# Patient Record
Sex: FEMALE | Race: WHITE | NOT HISPANIC OR LATINO | Employment: UNEMPLOYED | URBAN - NONMETROPOLITAN AREA
[De-identification: names, ages, dates, MRNs, and addresses within clinical notes are randomized per-mention and may not be internally consistent; named-entity substitution may affect disease eponyms.]

---

## 2022-01-28 ENCOUNTER — OFFICE VISIT (OUTPATIENT)
Dept: URGENT CARE | Facility: PHYSICIAN GROUP | Age: 27
End: 2022-01-28

## 2022-01-28 VITALS
SYSTOLIC BLOOD PRESSURE: 118 MMHG | RESPIRATION RATE: 16 BRPM | HEART RATE: 76 BPM | BODY MASS INDEX: 23.21 KG/M2 | OXYGEN SATURATION: 96 % | WEIGHT: 131 LBS | TEMPERATURE: 98.2 F | HEIGHT: 63 IN | DIASTOLIC BLOOD PRESSURE: 72 MMHG

## 2022-01-28 DIAGNOSIS — A63.0 GENITAL WARTS: ICD-10-CM

## 2022-01-28 PROCEDURE — 99204 OFFICE O/P NEW MOD 45 MIN: CPT | Performed by: NURSE PRACTITIONER

## 2022-01-28 RX ORDER — PODOFILOX 5 MG/G
GEL TOPICAL
Qty: 3.5 G | Refills: 2 | Status: SHIPPED | OUTPATIENT
Start: 2022-01-28 | End: 2022-02-01

## 2022-01-28 ASSESSMENT — ENCOUNTER SYMPTOMS
CHILLS: 0
FEVER: 0

## 2022-01-28 NOTE — PATIENT INSTRUCTIONS
Genital Warts  Genital warts are a common STD (sexually transmitted disease). They may appear as small bumps on the skin of the genital and anal areas. They sometimes become irritated and cause pain. Genital warts are easily passed to other people through sexual contact. Many people do not know that they are infected. They may be infected for years without symptoms. However, even if they do not have symptoms, they can pass the infection to their sexual partners.  Getting treatment is important because genital warts can lead to other problems. In females, the virus that causes genital warts may increase the risk for cervical cancer.  What are the causes?  This condition is caused by a virus that is called human papillomavirus (HPV). HPV is spread by having unprotected sex with an infected person. It can be spread through vaginal, anal, and oral sex.  What increases the risk?  You are more likely to develop this condition if:  · You have unprotected sex.  · You have multiple sexual partners.  · You are sexually active before age 16.  · You are a man who is not circumcised.  · You have a male sexual partner who is not circumcised.  · You have a weakened body defense system (immune system) due to disease or medicine.  · You smoke.  What are the signs or symptoms?  Symptoms of this condition include:  · Small growths in the genital area or anal area. These warts often grow in clusters.  · Itching and irritation in the genital area or anal area.  · Bleeding from the warts.  · Pain during sex.  How is this diagnosed?  This condition is diagnosed based on:  · Your symptoms.  · A physical exam.  You may also have other tests, including:  · Biopsy. A tissue sample is removed so it can be checked under a microscope.  · Colposcopy. In females, a magnifying tool is used to examine the vagina and cervix. Certain solutions may be used to make the HPV cells change color so they can be seen more easily.  · A Pap test in  females.  · Tests for other STDs.  How is this treated?  This condition may be treated with:  · Medicines, such as:  ? Solutions or creams applied to your skin (topical).  · Procedures, such as:  ? Freezing the warts with liquid nitrogen (cryotherapy).  ? Burning the warts with a laser or electric probe (electrocautery).  ? Surgery to remove the warts.  Follow these instructions at home:  Medicines    · Apply over-the-counter and prescription medicines only as told by your health care provider.  · Do not treat genital warts with medicines that are used for treating hand warts.  · Talk with your health care provider about using over-the-counter anti-itch creams.  Instructions for women  · Get screened regularly for cervical cancer. Women who have genital warts are at an increased risk for this cancer. This type of cancer is slow growing and can be cured if it is found early.  · If you become pregnant, tell your health care provider that you have had an HPV infection. Your health care provider will monitor you closely during pregnancy to be sure that your baby is safe.  General instructions  · Do not touch or scratch the warts.  · Do not have sex until your treatment has been completed.  · Tell your current and past sexual partners about your condition because they may also need treatment.  · After treatment, use condoms during sex to prevent future infections.  · Keep all follow-up visits as told by your health care provider. This is important.  How is this prevented?  Talk with your health care provider about getting the HPV vaccine. The vaccine:  · Can prevent some HPV infections and cancers.  · Is recommended for males and females who are 11-26 years old.  · Is not recommended for pregnant women.  · Will not work if you already have HPV.  Contact a health care provider if you:  · Have redness, swelling, or pain in the area of the treated skin.  · Have a fever.  · Feel generally ill.  · Feel lumps in and around  your genital or anal area.  · Have bleeding in your genital or anal area.  · Have pain during sex.  Summary  · Genital warts are a common STD (sexually transmitted disease). It may appear as small bumps on the genital and anal areas.  · This condition is caused by a virus that is called human papillomavirus (HPV). HPV is spread by having unprotected sex with an infected person. It can be spread through vaginal, anal, and oral sex.  · Treatment is important because genital warts can lead to other problems. In females, the virus that causes genital warts may increase the risk for cervical cancer.  · This condition may be treated with medicine that is applied to the skin, or procedures to remove the warts.  · The HPV vaccine can prevent some HPV infections and cancers. It is recommended that the vaccine be given to males and females who are 11-26 years old.  This information is not intended to replace advice given to you by your health care provider. Make sure you discuss any questions you have with your health care provider.  Document Released: 12/15/2001 Document Revised: 01/22/2019 Document Reviewed: 01/22/2019  [facility] [facility] Patient Education © 2020 [employer]

## 2022-01-28 NOTE — PROGRESS NOTES
"Subjective     Shannan Rubio is a 26 y.o. female who presents with Genital Warts (x2-3months)            Shannan comes in today with a 2-3 month history of mildly pruritic lesions of the external genital and perineum.  She has not tried any measures to treat the symptoms.  No history of similar symptoms, known genital warts/HPV, or herpes infections.  She has 1 sex partner, her  who does not have any known history of genital warts.  She had 1 pap smear years ago that was normal.  She is not established with a gynecologist and is overdue for routine health care, screening, and maintenance.        Review of Systems   Constitutional: Negative for chills, fever and malaise/fatigue.   Skin: Positive for itching.     Medications, Allergies, and current problem list reviewed today in Epic         Objective     Blood Pressure 118/72   Pulse 76   Temperature 36.8 °C (98.2 °F) (Temporal)   Respiration 16   Height 1.6 m (5' 3\")   Weight 59.4 kg (131 lb)   Oxygen Saturation 96%   Body Mass Index 23.21 kg/m²      Physical Exam  Vitals reviewed.   Constitutional:       General: She is not in acute distress.     Appearance: Normal appearance. She is not ill-appearing, toxic-appearing or diaphoretic.   Cardiovascular:      Rate and Rhythm: Normal rate and regular rhythm.      Heart sounds: Normal heart sounds.   Pulmonary:      Effort: Pulmonary effort is normal. No respiratory distress.      Breath sounds: Normal breath sounds. No stridor. No wheezing, rhonchi or rales.   Chest:      Chest wall: No tenderness.   Genitourinary:     Exam position: Supine.          Comments: Moderately distributed collection of genital warts with numerous 1 mm to 4 mm warts on the inferior labia majora surrounding vaginal opening and extending to perineum.  No intravaginal lesions noted.  No vaginal discharge.  No vesicular lesions.  No erythema.    Lymphadenopathy:      Lower Body: No right inguinal adenopathy. No left inguinal " adenopathy.   Neurological:      Mental Status: She is alert and oriented to person, place, and time.   Psychiatric:         Mood and Affect: Mood normal.                             Assessment & Plan        1. Genital warts  - Referral to Gynecology  - podofilox (CONDYLOX) 0.5 % gel; Using a cotton swab, the patient applies a 0.5 percent gel to external genital warts twice daily for three consecutive days No more than 0.5 mL of podofilox should be applied in one day. She then withholds treatment for four days, and repeats this cycle weekly four weeks.  Dispense: 3.5 g; Refill: 2     Discussed exam findings with Shannan.  Differential reviewed.  Discussed risks of transmission and transmission avoidance.  Podofilox as prescribed.  Follow up with a gynecologist in 1 month, sooner if worse.  She verbalized understanding of and agreed with plan of care.       ADDENDUM 2/1/22  PHONE call from Shannan re: rx too expensive ($700).  Will change to imiquimod 5% cream.  When priced on [employer] 12 packets costs less than $20.  Application and use directions reviewed. She will follow up with gynecology as referred within the next 4 weeks.  She verbalized understanding of and agreed with plan of care.  1. Genital warts  Referral to Gynecology    imiquimod (ALDARA) 5 % cream    DISCONTINUED: podofilox (CONDYLOX) 0.5 % gel

## 2022-02-01 RX ORDER — IMIQUIMOD 12.5 MG/.25G
CREAM TOPICAL
Qty: 12 EACH | Refills: 2 | Status: SHIPPED | OUTPATIENT
Start: 2022-02-01

## 2023-10-16 ENCOUNTER — OFFICE VISIT (OUTPATIENT)
Dept: URGENT CARE | Facility: PHYSICIAN GROUP | Age: 28
End: 2023-10-16

## 2023-10-16 VITALS
RESPIRATION RATE: 16 BRPM | HEIGHT: 63 IN | WEIGHT: 149 LBS | SYSTOLIC BLOOD PRESSURE: 110 MMHG | TEMPERATURE: 97.8 F | OXYGEN SATURATION: 100 % | BODY MASS INDEX: 26.4 KG/M2 | HEART RATE: 102 BPM | DIASTOLIC BLOOD PRESSURE: 68 MMHG

## 2023-10-16 DIAGNOSIS — N30.01 ACUTE CYSTITIS WITH HEMATURIA: ICD-10-CM

## 2023-10-16 DIAGNOSIS — R30.0 DYSURIA: ICD-10-CM

## 2023-10-16 LAB
APPEARANCE UR: NORMAL
BILIRUB UR STRIP-MCNC: NORMAL MG/DL
COLOR UR AUTO: NORMAL
GLUCOSE UR STRIP.AUTO-MCNC: NORMAL MG/DL
KETONES UR STRIP.AUTO-MCNC: >=160 MG/DL
LEUKOCYTE ESTERASE UR QL STRIP.AUTO: NORMAL
NITRITE UR QL STRIP.AUTO: NORMAL
PH UR STRIP.AUTO: 6 [PH] (ref 5–8)
POCT INT CON NEG: NEGATIVE
POCT INT CON POS: POSITIVE
POCT URINE PREGNANCY TEST: NEGATIVE
PROT UR QL STRIP: 100 MG/DL
RBC UR QL AUTO: NORMAL
SP GR UR STRIP.AUTO: >=1.03
UROBILINOGEN UR STRIP-MCNC: 0.2 MG/DL

## 2023-10-16 PROCEDURE — 99214 OFFICE O/P EST MOD 30 MIN: CPT | Performed by: PHYSICIAN ASSISTANT

## 2023-10-16 PROCEDURE — 81025 URINE PREGNANCY TEST: CPT | Performed by: PHYSICIAN ASSISTANT

## 2023-10-16 PROCEDURE — 81002 URINALYSIS NONAUTO W/O SCOPE: CPT | Performed by: PHYSICIAN ASSISTANT

## 2023-10-16 PROCEDURE — 3078F DIAST BP <80 MM HG: CPT | Performed by: PHYSICIAN ASSISTANT

## 2023-10-16 PROCEDURE — 3074F SYST BP LT 130 MM HG: CPT | Performed by: PHYSICIAN ASSISTANT

## 2023-10-16 RX ORDER — SULFAMETHOXAZOLE AND TRIMETHOPRIM 800; 160 MG/1; MG/1
1 TABLET ORAL 2 TIMES DAILY
Qty: 14 TABLET | Refills: 0 | Status: SHIPPED | OUTPATIENT
Start: 2023-10-16 | End: 2023-10-23

## 2023-10-16 ASSESSMENT — ENCOUNTER SYMPTOMS
VOMITING: 0
NAUSEA: 1
FLANK PAIN: 1
CHILLS: 1
SWEATS: 0

## 2023-10-16 NOTE — PROGRESS NOTES
Subjective:   Shannan Rubio is a 27 y.o. female who presents for UTI (X 2 days with odoruos urine, R flank pain, chills, urgency and frequency. )        Dysuria   This is a new problem. The current episode started yesterday. The problem occurs every urination. The problem has been rapidly worsening. The quality of the pain is described as burning. The pain is mild. There has been no fever. She is Sexually active (no concerns for std.). There is No history of pyelonephritis. Associated symptoms include chills, flank pain (intermittent, right sided), frequency, nausea and urgency. Pertinent negatives include no discharge (normal d/c, small amount, clearish), hematuria, hesitancy, possible pregnancy, sweats or vomiting. She has tried increased fluids for the symptoms. The treatment provided no relief. There is no history of kidney stones, recurrent UTIs or a urological procedure.     Review of Systems   Constitutional:  Positive for chills.   Gastrointestinal:  Positive for nausea. Negative for vomiting.   Genitourinary:  Positive for dysuria, flank pain (intermittent, right sided), frequency and urgency. Negative for hematuria and hesitancy.       PMH:  has no past medical history on file.  MEDS:   Current Outpatient Medications:     sulfamethoxazole-trimethoprim (BACTRIM DS) 800-160 MG tablet, Take 1 Tablet by mouth 2 times a day for 7 days., Disp: 14 Tablet, Rfl: 0    imiquimod (ALDARA) 5 % cream, Apply cream topically to warts 3 times per week (Monday, Wednesday, and Friday) until there is total clearance of warts or a maximum of 16 weeks.  Apply a thin layer to each wart at bedtime and rub the cream in until no longer visible.  Wash the cream off in the morning with soap and water. (Patient not taking: Reported on 10/16/2023), Disp: 12 Each, Rfl: 2  ALLERGIES: No Known Allergies  SURGHX: No past surgical history on file.  SOCHX:    FH: Family history was reviewed, no pertinent findings to report   Objective:   BP  "110/68   Pulse (!) 102   Temp 36.6 °C (97.8 °F) (Temporal)   Resp 16   Ht 1.6 m (5' 3\")   Wt 67.6 kg (149 lb)   SpO2 100%   BMI 26.39 kg/m²   Physical Exam  Vitals reviewed.   Constitutional:       General: She is not in acute distress.     Appearance: Normal appearance. She is well-developed. She is not toxic-appearing.   HENT:      Head: Normocephalic and atraumatic.      Right Ear: External ear normal.      Left Ear: External ear normal.      Nose: Nose normal.   Cardiovascular:      Rate and Rhythm: Normal rate and regular rhythm.      Comments: Borderline tachycardic  Pulmonary:      Effort: Pulmonary effort is normal. No respiratory distress.      Breath sounds: No stridor.   Abdominal:      Comments: Normoactive bowel sounds.  Patient very mildly tender to palpation over the suprapubic area and very mildly over the right flank.  Otherwise abdomen is soft and nontender to palpation.  No guarding or rebound tenderness.  No palpable masses organomegaly.  No tenderness of McBurney's point.  Negative Capellan's.   Skin:     General: Skin is dry.   Neurological:      Comments: Alert and oriented.    Psychiatric:         Speech: Speech normal.         Behavior: Behavior normal.           Assessment/Plan:   1. Acute cystitis with hematuria  - sulfamethoxazole-trimethoprim (BACTRIM DS) 800-160 MG tablet; Take 1 Tablet by mouth 2 times a day for 7 days.  Dispense: 14 Tablet; Refill: 0    2. Dysuria  - POCT Urinalysis  - POCT Pregnancy    UA positive for RBCs and leuks.  UA and symptoms consistent with acute cystitis.  Patient has mild right-sided flank pain and is borderline tachycardic.  Early pyelonephritis is a consideration.  Clinical suspicion for nephrolithiasis low at this time but consideration discussed. Pt denies risk factors for STI/PID.    Patient declines urine culture due to cost concerns.  Patient was started on extended course of Bactrim.  May take Azo as needed for pain.  Continue to push " fluids.    If symptoms fail to improve in 48 hours, worsen at any point or you develop fever, flank pain, nausea, vomiting, or other new symptoms return to the clinic immediately or go the ER for reevaluation.

## 2025-07-08 ENCOUNTER — OFFICE VISIT (OUTPATIENT)
Dept: URGENT CARE | Facility: PHYSICIAN GROUP | Age: 30
End: 2025-07-08

## 2025-07-08 ENCOUNTER — HOSPITAL ENCOUNTER (OUTPATIENT)
Facility: MEDICAL CENTER | Age: 30
End: 2025-07-08

## 2025-07-08 VITALS
BODY MASS INDEX: 28.63 KG/M2 | OXYGEN SATURATION: 98 % | HEIGHT: 63 IN | DIASTOLIC BLOOD PRESSURE: 80 MMHG | RESPIRATION RATE: 14 BRPM | HEART RATE: 80 BPM | SYSTOLIC BLOOD PRESSURE: 122 MMHG | TEMPERATURE: 98.7 F | WEIGHT: 161.6 LBS

## 2025-07-08 DIAGNOSIS — N76.0 ACUTE VAGINITIS: Primary | ICD-10-CM

## 2025-07-08 LAB
APPEARANCE UR: CLEAR
BILIRUB UR STRIP-MCNC: NORMAL MG/DL
COLOR UR AUTO: YELLOW
GLUCOSE UR STRIP.AUTO-MCNC: NORMAL MG/DL
KETONES UR STRIP.AUTO-MCNC: NORMAL MG/DL
LEUKOCYTE ESTERASE UR QL STRIP.AUTO: NORMAL
NITRITE UR QL STRIP.AUTO: NORMAL
PH UR STRIP.AUTO: 6 [PH] (ref 5–8)
PROT UR QL STRIP: NORMAL MG/DL
RBC UR QL AUTO: NORMAL
SP GR UR STRIP.AUTO: 1.02
UROBILINOGEN UR STRIP-MCNC: 0.2 MG/DL

## 2025-07-08 PROCEDURE — 87660 TRICHOMONAS VAGIN DIR PROBE: CPT

## 2025-07-08 PROCEDURE — 3074F SYST BP LT 130 MM HG: CPT

## 2025-07-08 PROCEDURE — 87591 N.GONORRHOEAE DNA AMP PROB: CPT

## 2025-07-08 PROCEDURE — 87491 CHLMYD TRACH DNA AMP PROBE: CPT

## 2025-07-08 PROCEDURE — 99214 OFFICE O/P EST MOD 30 MIN: CPT

## 2025-07-08 PROCEDURE — 87510 GARDNER VAG DNA DIR PROBE: CPT

## 2025-07-08 PROCEDURE — 3079F DIAST BP 80-89 MM HG: CPT

## 2025-07-08 PROCEDURE — 87480 CANDIDA DNA DIR PROBE: CPT

## 2025-07-08 PROCEDURE — 81002 URINALYSIS NONAUTO W/O SCOPE: CPT

## 2025-07-08 ASSESSMENT — ENCOUNTER SYMPTOMS
DIARRHEA: 0
EYE DISCHARGE: 0
CONSTIPATION: 0
FEVER: 0
VOMITING: 0
VAGINITIS: 1
BLOOD IN STOOL: 0
EYE REDNESS: 0
COUGH: 0
BRUISES/BLEEDS EASILY: 0
WHEEZING: 0

## 2025-07-08 NOTE — PROGRESS NOTES
"Subjective:     Shannan Rubio is a 29 y.o. female who presents for Vaginitis (Pt states possible yeast infection, one week with symptoms. Pt tried at home remedies and OTC medications but hasn't helped. Pt states very raw, itchy and off white/discolored discharge. )      Vaginitis  The patient's primary symptoms include genital itching and vaginal discharge. Pertinent negatives include no constipation, diarrhea, fever, frequency, hematuria, rash or vomiting. The vaginal discharge was green.       Review of Systems   Constitutional:  Negative for fever.   HENT:  Negative for congestion and ear discharge.    Eyes:  Negative for discharge and redness.   Respiratory:  Negative for cough and wheezing.    Gastrointestinal:  Negative for blood in stool, constipation, diarrhea and vomiting.   Genitourinary:  Positive for vaginal discharge. Negative for frequency and hematuria.   Skin:  Negative for itching and rash.   Endo/Heme/Allergies:  Does not bruise/bleed easily.        CURRENT MEDICATIONS:  imiquimod    Allergies:   Allergies[1]    Current Problems: Shannan Rubio does not have a problem list on file.  Past Surgical Hx:  No past surgical history on file.   Past Social Hx:  reports that she has never smoked. She has never used smokeless tobacco. She reports that she does not currently use alcohol. She reports current drug use. Drug: Marijuana.   Past Family Hx:  Shannan Rubio family history is not on file.     (Allergies, Medications, & Tobacco/Substance Use were reconciled by the Medical Assistant and reviewed by myself. The family history is prepopulated)       Objective:     /80   Pulse 80   Temp 37.1 °C (98.7 °F) (Temporal)   Resp 14   Ht 1.6 m (5' 3\")   Wt 73.3 kg (161 lb 9.6 oz)   LMP 06/24/2025 (Approximate)   SpO2 98%   Breastfeeding No   BMI 28.63 kg/m²     Physical Exam  Constitutional:       General: She is not in acute distress.     Appearance: Normal appearance. She is not ill-appearing, " toxic-appearing or diaphoretic.   HENT:      Head: Normocephalic and atraumatic.      Nose: Nose normal.   Eyes:      General: No scleral icterus.        Right eye: No discharge.         Left eye: No discharge.   Cardiovascular:      Rate and Rhythm: Normal rate and regular rhythm.      Heart sounds: Normal heart sounds. No murmur heard.     No friction rub. No gallop.   Pulmonary:      Effort: Pulmonary effort is normal. No respiratory distress.      Breath sounds: No stridor. No wheezing, rhonchi or rales.   Chest:      Chest wall: No tenderness.   Abdominal:      General: Abdomen is flat.      Palpations: Abdomen is soft.   Genitourinary:     Vagina: Vaginal discharge present.   Musculoskeletal:         General: Normal range of motion.      Cervical back: No rigidity.   Skin:     General: Skin is warm and dry.   Neurological:      General: No focal deficit present.      Mental Status: She is alert and oriented to person, place, and time. Mental status is at baseline.   Psychiatric:         Behavior: Behavior normal.         Judgment: Judgment normal.       Assessment/Plan:     Shannan was seen today for vaginitis.    Diagnoses and all orders for this visit:    Acute vaginitis  -     VAGINAL PATHOGENS DNA PANEL; Future  -     Chlamydia/GC, PCR (Genital/Anal swab); Future  -     POCT Urinalysis     Based on history of presenting illness, review of systems and physical exam findings, most likely etiology of vaginal discharge is unknown and requires further workup as ordered. discussed symptomatic management with pharmacotherapy and over-the-counter medications.  On my exam I do not see any signs or symptoms consistent with a bacterial infection currently requiring oral antibiotics.  Discussed the risks the benefits and the indications of all new medications prescribed today.  Strict return and ED precautions were discussed and all questions were answered.     Patient presents for symptomatic STD testing.  Vaginal  itching with green discharge.     Labs Ordered:  NAAT for Chlamydia and Gonorrhea (urine, vaginal, rectal, or pharyngeal as indicated)  Vaginitis Panel  POCT UA    Vaccinations:  HPV vaccine: Encouraged   Hepatitis A/B vaccine: Encouraged   Discussed safe sex practices, condom use, and partner notification.  Provided information on PrEP (if applicable).  Follow-Up: If indicated and desired  Results to be reviewed in 2-3 days.  Retesting in 3 months if positive or per CDC guidelines.  Encourage partner testing and treatment if needed.    Advised the patient to follow-up with the primary care physician for recheck, reevaluation, and consideration of further management.     Differential diagnosis, natural history, supportive care, and indications for immediate follow-up discussed.      Please note that this dictation was created using voice recognition software. I have made reasonable attempt to correct obvious errors, but I expect that there are errors of grammar and possibly content that I did not discover before finalizing the note.    This note was electronically signed by Dorene Rios MD PhD         [1] No Known Allergies

## 2025-07-09 DIAGNOSIS — N76.0 ACUTE VAGINITIS: ICD-10-CM

## 2025-07-10 LAB
C TRACH DNA GENITAL QL NAA+PROBE: NEGATIVE
CANDIDA DNA VAG QL PROBE+SIG AMP: NEGATIVE
G VAGINALIS DNA VAG QL PROBE+SIG AMP: POSITIVE
N GONORRHOEA DNA GENITAL QL NAA+PROBE: NEGATIVE
SPECIMEN SOURCE: NORMAL
T VAGINALIS DNA VAG QL PROBE+SIG AMP: NEGATIVE

## 2025-07-11 DIAGNOSIS — N76.0 BACTERIAL VAGINOSIS: Primary | ICD-10-CM

## 2025-07-11 DIAGNOSIS — B96.89 BACTERIAL VAGINOSIS: Primary | ICD-10-CM

## 2025-07-11 RX ORDER — METRONIDAZOLE 500 MG/1
500 TABLET ORAL 2 TIMES DAILY
Qty: 14 TABLET | Refills: 0 | Status: SHIPPED | OUTPATIENT
Start: 2025-07-11 | End: 2025-07-18

## 2025-07-11 NOTE — PROGRESS NOTES
Diagnoses and all orders for this visit:    Bacterial vaginosis  -     metroNIDAZOLE (FLAGYL) 500 MG Tab; Take 1 Tablet by mouth 2 times a day for 7 days.     Discussed the risks the benefits and the indications of all new medications prescribed today.  Strict return and ED precautions were discussed and all questions were answered.